# Patient Record
Sex: FEMALE | Race: WHITE | URBAN - METROPOLITAN AREA
[De-identification: names, ages, dates, MRNs, and addresses within clinical notes are randomized per-mention and may not be internally consistent; named-entity substitution may affect disease eponyms.]

---

## 2021-10-29 LAB
HEPATITIS B SURFACE ANTIGEN (EXTERNAL): NEGATIVE
RUBELLA ANTIBODY IGG (EXTERNAL): NORMAL
TREPONEMA PALLIDUM ANTIBODY (EXTERNAL): NONREACTIVE

## 2021-11-01 LAB — PAP SMEAR - HIM PATIENT REPORTED: NEGATIVE

## 2021-12-08 ENCOUNTER — HOSPITAL ENCOUNTER (OUTPATIENT)
Facility: CLINIC | Age: 36
End: 2021-12-08
Admitting: OBSTETRICS & GYNECOLOGY
Payer: COMMERCIAL

## 2021-12-24 LAB
ABO (EXTERNAL): NORMAL
RH (EXTERNAL): NEGATIVE

## 2021-12-30 ENCOUNTER — PRE VISIT (OUTPATIENT)
Dept: MATERNAL FETAL MEDICINE | Facility: CLINIC | Age: 36
End: 2021-12-30
Payer: COMMERCIAL

## 2022-01-06 ENCOUNTER — OFFICE VISIT (OUTPATIENT)
Dept: MATERNAL FETAL MEDICINE | Facility: CLINIC | Age: 37
End: 2022-01-06
Attending: NURSE PRACTITIONER
Payer: COMMERCIAL

## 2022-01-06 ENCOUNTER — HOSPITAL ENCOUNTER (OUTPATIENT)
Dept: ULTRASOUND IMAGING | Facility: CLINIC | Age: 37
Discharge: HOME | End: 2022-01-06
Attending: NURSE PRACTITIONER | Admitting: NURSE PRACTITIONER
Payer: COMMERCIAL

## 2022-01-06 DIAGNOSIS — O26.90 PREGNANCY RELATED CONDITION, ANTEPARTUM: ICD-10-CM

## 2022-01-06 DIAGNOSIS — O09.512 AMA (ADVANCED MATERNAL AGE) PRIMIGRAVIDA 35+, SECOND TRIMESTER: Primary | ICD-10-CM

## 2022-01-06 PROCEDURE — 76811 OB US DETAILED SNGL FETUS: CPT | Mod: 26 | Performed by: OBSTETRICS & GYNECOLOGY

## 2022-01-06 PROCEDURE — 76811 OB US DETAILED SNGL FETUS: CPT

## 2022-01-06 NOTE — PROGRESS NOTES
Please refer to ultrasound report under 'Imaging' Studies of 'Chart Review' tabs.    Sunny Rendon M.D.

## 2022-02-06 ENCOUNTER — HEALTH MAINTENANCE LETTER (OUTPATIENT)
Age: 37
End: 2022-02-06

## 2022-05-12 ENCOUNTER — TRANSFERRED RECORDS (OUTPATIENT)
Dept: HEALTH INFORMATION MANAGEMENT | Facility: CLINIC | Age: 37
End: 2022-05-12
Payer: COMMERCIAL

## 2022-05-12 LAB — GROUP B STREPTOCOCCUS (EXTERNAL): NEGATIVE

## 2022-06-06 ENCOUNTER — HOSPITAL ENCOUNTER (INPATIENT)
Facility: CLINIC | Age: 37
LOS: 3 days | Discharge: HOME | End: 2022-06-09
Attending: OBSTETRICS & GYNECOLOGY | Admitting: OBSTETRICS & GYNECOLOGY
Payer: COMMERCIAL

## 2022-06-06 LAB
ABO/RH(D): ABNORMAL
ANTIBODY SCREEN: POSITIVE
SPECIMEN EXPIRATION DATE: ABNORMAL

## 2022-06-06 PROCEDURE — 722N000001 HC LABOR CARE VAGINAL DELIVERY SINGLE

## 2022-06-06 PROCEDURE — G0463 HOSPITAL OUTPT CLINIC VISIT: HCPCS | Mod: 25

## 2022-06-06 PROCEDURE — 59025 FETAL NON-STRESS TEST: CPT

## 2022-06-06 PROCEDURE — 120N000001 HC R&B MED SURG/OB

## 2022-06-06 RX ORDER — NALOXONE HYDROCHLORIDE 0.4 MG/ML
0.4 INJECTION, SOLUTION INTRAMUSCULAR; INTRAVENOUS; SUBCUTANEOUS
Status: DISCONTINUED | OUTPATIENT
Start: 2022-06-06 | End: 2022-06-07 | Stop reason: HOSPADM

## 2022-06-06 RX ORDER — CARBOPROST TROMETHAMINE 250 UG/ML
250 INJECTION, SOLUTION INTRAMUSCULAR
Status: DISCONTINUED | OUTPATIENT
Start: 2022-06-06 | End: 2022-06-07 | Stop reason: HOSPADM

## 2022-06-06 RX ORDER — PROCHLORPERAZINE 25 MG
25 SUPPOSITORY, RECTAL RECTAL EVERY 12 HOURS PRN
Status: DISCONTINUED | OUTPATIENT
Start: 2022-06-06 | End: 2022-06-07 | Stop reason: HOSPADM

## 2022-06-06 RX ORDER — OXYTOCIN 10 [USP'U]/ML
10 INJECTION, SOLUTION INTRAMUSCULAR; INTRAVENOUS
Status: DISCONTINUED | OUTPATIENT
Start: 2022-06-06 | End: 2022-06-08

## 2022-06-06 RX ORDER — ONDANSETRON 4 MG/1
4 TABLET, ORALLY DISINTEGRATING ORAL EVERY 6 HOURS PRN
Status: DISCONTINUED | OUTPATIENT
Start: 2022-06-06 | End: 2022-06-07 | Stop reason: HOSPADM

## 2022-06-06 RX ORDER — PROCHLORPERAZINE MALEATE 5 MG
10 TABLET ORAL EVERY 6 HOURS PRN
Status: DISCONTINUED | OUTPATIENT
Start: 2022-06-06 | End: 2022-06-07 | Stop reason: HOSPADM

## 2022-06-06 RX ORDER — SODIUM CHLORIDE, SODIUM LACTATE, POTASSIUM CHLORIDE, CALCIUM CHLORIDE 600; 310; 30; 20 MG/100ML; MG/100ML; MG/100ML; MG/100ML
INJECTION, SOLUTION INTRAVENOUS CONTINUOUS
Status: DISCONTINUED | OUTPATIENT
Start: 2022-06-07 | End: 2022-06-07 | Stop reason: HOSPADM

## 2022-06-06 RX ORDER — MULTIVITAMIN WITH IRON
1 TABLET ORAL DAILY
COMMUNITY

## 2022-06-06 RX ORDER — ONDANSETRON 4 MG/1
4 TABLET, ORALLY DISINTEGRATING ORAL EVERY 6 HOURS PRN
Status: DISCONTINUED | OUTPATIENT
Start: 2022-06-06 | End: 2022-06-06 | Stop reason: HOSPADM

## 2022-06-06 RX ORDER — OXYTOCIN/0.9 % SODIUM CHLORIDE 30/500 ML
340 PLASTIC BAG, INJECTION (ML) INTRAVENOUS CONTINUOUS PRN
Status: DISCONTINUED | OUTPATIENT
Start: 2022-06-06 | End: 2022-06-07 | Stop reason: HOSPADM

## 2022-06-06 RX ORDER — OXYTOCIN/0.9 % SODIUM CHLORIDE 30/500 ML
100-340 PLASTIC BAG, INJECTION (ML) INTRAVENOUS CONTINUOUS PRN
Status: DISCONTINUED | OUTPATIENT
Start: 2022-06-06 | End: 2022-06-08

## 2022-06-06 RX ORDER — METHYLERGONOVINE MALEATE 0.2 MG/ML
200 INJECTION INTRAVENOUS
Status: DISCONTINUED | OUTPATIENT
Start: 2022-06-06 | End: 2022-06-07 | Stop reason: HOSPADM

## 2022-06-06 RX ORDER — METOCLOPRAMIDE 10 MG/1
10 TABLET ORAL EVERY 6 HOURS PRN
Status: DISCONTINUED | OUTPATIENT
Start: 2022-06-06 | End: 2022-06-06 | Stop reason: HOSPADM

## 2022-06-06 RX ORDER — ONDANSETRON 2 MG/ML
4 INJECTION INTRAMUSCULAR; INTRAVENOUS EVERY 6 HOURS PRN
Status: DISCONTINUED | OUTPATIENT
Start: 2022-06-06 | End: 2022-06-06 | Stop reason: HOSPADM

## 2022-06-06 RX ORDER — ACETAMINOPHEN 325 MG/1
650 TABLET ORAL EVERY 4 HOURS PRN
Status: DISCONTINUED | OUTPATIENT
Start: 2022-06-06 | End: 2022-06-07 | Stop reason: HOSPADM

## 2022-06-06 RX ORDER — PRENATAL VIT/IRON FUM/FOLIC AC 27MG-0.8MG
1 TABLET ORAL DAILY
COMMUNITY

## 2022-06-06 RX ORDER — KETOROLAC TROMETHAMINE 30 MG/ML
30 INJECTION, SOLUTION INTRAMUSCULAR; INTRAVENOUS
Status: DISCONTINUED | OUTPATIENT
Start: 2022-06-06 | End: 2022-06-08

## 2022-06-06 RX ORDER — METOCLOPRAMIDE HYDROCHLORIDE 5 MG/ML
10 INJECTION INTRAMUSCULAR; INTRAVENOUS EVERY 6 HOURS PRN
Status: DISCONTINUED | OUTPATIENT
Start: 2022-06-06 | End: 2022-06-07 | Stop reason: HOSPADM

## 2022-06-06 RX ORDER — MISOPROSTOL 200 UG/1
400 TABLET ORAL
Status: DISCONTINUED | OUTPATIENT
Start: 2022-06-06 | End: 2022-06-07 | Stop reason: HOSPADM

## 2022-06-06 RX ORDER — PROCHLORPERAZINE 25 MG
25 SUPPOSITORY, RECTAL RECTAL EVERY 12 HOURS PRN
Status: DISCONTINUED | OUTPATIENT
Start: 2022-06-06 | End: 2022-06-06 | Stop reason: HOSPADM

## 2022-06-06 RX ORDER — MISOPROSTOL 200 UG/1
800 TABLET ORAL
Status: DISCONTINUED | OUTPATIENT
Start: 2022-06-06 | End: 2022-06-07 | Stop reason: HOSPADM

## 2022-06-06 RX ORDER — FENTANYL CITRATE 50 UG/ML
100 INJECTION, SOLUTION INTRAMUSCULAR; INTRAVENOUS
Status: DISCONTINUED | OUTPATIENT
Start: 2022-06-06 | End: 2022-06-07 | Stop reason: HOSPADM

## 2022-06-06 RX ORDER — NALOXONE HYDROCHLORIDE 0.4 MG/ML
0.2 INJECTION, SOLUTION INTRAMUSCULAR; INTRAVENOUS; SUBCUTANEOUS
Status: DISCONTINUED | OUTPATIENT
Start: 2022-06-06 | End: 2022-06-07 | Stop reason: HOSPADM

## 2022-06-06 RX ORDER — METOCLOPRAMIDE 10 MG/1
10 TABLET ORAL EVERY 6 HOURS PRN
Status: DISCONTINUED | OUTPATIENT
Start: 2022-06-06 | End: 2022-06-07 | Stop reason: HOSPADM

## 2022-06-06 RX ORDER — ONDANSETRON 2 MG/ML
4 INJECTION INTRAMUSCULAR; INTRAVENOUS EVERY 6 HOURS PRN
Status: DISCONTINUED | OUTPATIENT
Start: 2022-06-06 | End: 2022-06-07 | Stop reason: HOSPADM

## 2022-06-06 RX ORDER — IBUPROFEN 400 MG/1
800 TABLET, FILM COATED ORAL
Status: DISCONTINUED | OUTPATIENT
Start: 2022-06-06 | End: 2022-06-08

## 2022-06-06 RX ORDER — PROCHLORPERAZINE MALEATE 5 MG
10 TABLET ORAL EVERY 6 HOURS PRN
Status: DISCONTINUED | OUTPATIENT
Start: 2022-06-06 | End: 2022-06-06 | Stop reason: HOSPADM

## 2022-06-06 RX ORDER — CITRIC ACID/SODIUM CITRATE 334-500MG
30 SOLUTION, ORAL ORAL
Status: DISCONTINUED | OUTPATIENT
Start: 2022-06-06 | End: 2022-06-07 | Stop reason: HOSPADM

## 2022-06-06 RX ORDER — OXYTOCIN 10 [USP'U]/ML
10 INJECTION, SOLUTION INTRAMUSCULAR; INTRAVENOUS
Status: DISCONTINUED | OUTPATIENT
Start: 2022-06-06 | End: 2022-06-07 | Stop reason: HOSPADM

## 2022-06-06 RX ORDER — TRANEXAMIC ACID 10 MG/ML
1 INJECTION, SOLUTION INTRAVENOUS EVERY 30 MIN PRN
Status: DISCONTINUED | OUTPATIENT
Start: 2022-06-06 | End: 2022-06-07 | Stop reason: HOSPADM

## 2022-06-06 RX ORDER — METOCLOPRAMIDE HYDROCHLORIDE 5 MG/ML
10 INJECTION INTRAMUSCULAR; INTRAVENOUS EVERY 6 HOURS PRN
Status: DISCONTINUED | OUTPATIENT
Start: 2022-06-06 | End: 2022-06-06 | Stop reason: HOSPADM

## 2022-06-06 ASSESSMENT — ACTIVITIES OF DAILY LIVING (ADL)
FALL_HISTORY_WITHIN_LAST_SIX_MONTHS: NO
WEAR_GLASSES_OR_BLIND: NO
CHANGE_IN_FUNCTIONAL_STATUS_SINCE_ONSET_OF_CURRENT_ILLNESS/INJURY: NO
WALKING_OR_CLIMBING_STAIRS_DIFFICULTY: NO
DRESSING/BATHING_DIFFICULTY: NO
DOING_ERRANDS_INDEPENDENTLY_DIFFICULTY: NO
TOILETING_ISSUES: NO
CONCENTRATING,_REMEMBERING_OR_MAKING_DECISIONS_DIFFICULTY: NO
DIFFICULTY_EATING/SWALLOWING: NO

## 2022-06-07 LAB
ANTIBODY ID: NORMAL
ERYTHROCYTE [DISTWIDTH] IN BLOOD BY AUTOMATED COUNT: 12.8 % (ref 10–15)
HCT VFR BLD AUTO: 43.8 % (ref 35–47)
HGB BLD-MCNC: 14.8 G/DL (ref 11.7–15.7)
MCH RBC QN AUTO: 30.5 PG (ref 26.5–33)
MCHC RBC AUTO-ENTMCNC: 33.8 G/DL (ref 31.5–36.5)
MCV RBC AUTO: 90 FL (ref 78–100)
PLATELET # BLD AUTO: 187 10E3/UL (ref 150–450)
RBC # BLD AUTO: 4.85 10E6/UL (ref 3.8–5.2)
SARS-COV-2 RNA RESP QL NAA+PROBE: NEGATIVE
SPECIMEN EXPIRATION DATE: NORMAL
T PALLIDUM AB SER QL: NONREACTIVE
WBC # BLD AUTO: 13.5 10E3/UL (ref 4–11)

## 2022-06-07 PROCEDURE — 86870 RBC ANTIBODY IDENTIFICATION: CPT | Performed by: OBSTETRICS & GYNECOLOGY

## 2022-06-07 PROCEDURE — 86780 TREPONEMA PALLIDUM: CPT | Performed by: OBSTETRICS & GYNECOLOGY

## 2022-06-07 PROCEDURE — 10907ZC DRAINAGE OF AMNIOTIC FLUID, THERAPEUTIC FROM PRODUCTS OF CONCEPTION, VIA NATURAL OR ARTIFICIAL OPENING: ICD-10-PCS | Performed by: OBSTETRICS & GYNECOLOGY

## 2022-06-07 PROCEDURE — G0463 HOSPITAL OUTPT CLINIC VISIT: HCPCS | Mod: 25

## 2022-06-07 PROCEDURE — 86850 RBC ANTIBODY SCREEN: CPT | Performed by: OBSTETRICS & GYNECOLOGY

## 2022-06-07 PROCEDURE — 36415 COLL VENOUS BLD VENIPUNCTURE: CPT | Performed by: OBSTETRICS & GYNECOLOGY

## 2022-06-07 PROCEDURE — 59025 FETAL NON-STRESS TEST: CPT

## 2022-06-07 PROCEDURE — 85027 COMPLETE CBC AUTOMATED: CPT | Performed by: OBSTETRICS & GYNECOLOGY

## 2022-06-07 PROCEDURE — 0HQ9XZZ REPAIR PERINEUM SKIN, EXTERNAL APPROACH: ICD-10-PCS | Performed by: OBSTETRICS & GYNECOLOGY

## 2022-06-07 PROCEDURE — 250N000009 HC RX 250: Performed by: OBSTETRICS & GYNECOLOGY

## 2022-06-07 PROCEDURE — 722N000001 HC LABOR CARE VAGINAL DELIVERY SINGLE

## 2022-06-07 PROCEDURE — 0UQGXZZ REPAIR VAGINA, EXTERNAL APPROACH: ICD-10-PCS | Performed by: OBSTETRICS & GYNECOLOGY

## 2022-06-07 PROCEDURE — U0003 INFECTIOUS AGENT DETECTION BY NUCLEIC ACID (DNA OR RNA); SEVERE ACUTE RESPIRATORY SYNDROME CORONAVIRUS 2 (SARS-COV-2) (CORONAVIRUS DISEASE [COVID-19]), AMPLIFIED PROBE TECHNIQUE, MAKING USE OF HIGH THROUGHPUT TECHNOLOGIES AS DESCRIBED BY CMS-2020-01-R: HCPCS | Performed by: OBSTETRICS & GYNECOLOGY

## 2022-06-07 PROCEDURE — 120N000012 HC R&B POSTPARTUM

## 2022-06-07 RX ORDER — BISACODYL 10 MG
10 SUPPOSITORY, RECTAL RECTAL DAILY PRN
Status: DISCONTINUED | OUTPATIENT
Start: 2022-06-07 | End: 2022-06-09 | Stop reason: HOSPADM

## 2022-06-07 RX ORDER — IBUPROFEN 400 MG/1
800 TABLET, FILM COATED ORAL EVERY 6 HOURS PRN
Status: DISCONTINUED | OUTPATIENT
Start: 2022-06-07 | End: 2022-06-09 | Stop reason: HOSPADM

## 2022-06-07 RX ORDER — MODIFIED LANOLIN
OINTMENT (GRAM) TOPICAL
Status: DISCONTINUED | OUTPATIENT
Start: 2022-06-07 | End: 2022-06-09 | Stop reason: HOSPADM

## 2022-06-07 RX ORDER — MISOPROSTOL 200 UG/1
400 TABLET ORAL
Status: DISCONTINUED | OUTPATIENT
Start: 2022-06-07 | End: 2022-06-09 | Stop reason: HOSPADM

## 2022-06-07 RX ORDER — ACETAMINOPHEN 325 MG/1
650 TABLET ORAL EVERY 4 HOURS PRN
Status: DISCONTINUED | OUTPATIENT
Start: 2022-06-07 | End: 2022-06-09 | Stop reason: HOSPADM

## 2022-06-07 RX ORDER — HYDROCORTISONE 25 MG/G
CREAM TOPICAL 3 TIMES DAILY PRN
Status: DISCONTINUED | OUTPATIENT
Start: 2022-06-07 | End: 2022-06-09 | Stop reason: HOSPADM

## 2022-06-07 RX ORDER — CARBOPROST TROMETHAMINE 250 UG/ML
250 INJECTION, SOLUTION INTRAMUSCULAR
Status: DISCONTINUED | OUTPATIENT
Start: 2022-06-07 | End: 2022-06-09 | Stop reason: HOSPADM

## 2022-06-07 RX ORDER — MISOPROSTOL 200 UG/1
800 TABLET ORAL
Status: DISCONTINUED | OUTPATIENT
Start: 2022-06-07 | End: 2022-06-09 | Stop reason: HOSPADM

## 2022-06-07 RX ORDER — DOCUSATE SODIUM 100 MG/1
100 CAPSULE, LIQUID FILLED ORAL DAILY
Status: DISCONTINUED | OUTPATIENT
Start: 2022-06-07 | End: 2022-06-09 | Stop reason: HOSPADM

## 2022-06-07 RX ORDER — OXYTOCIN/0.9 % SODIUM CHLORIDE 30/500 ML
340 PLASTIC BAG, INJECTION (ML) INTRAVENOUS CONTINUOUS PRN
Status: DISCONTINUED | OUTPATIENT
Start: 2022-06-07 | End: 2022-06-09 | Stop reason: HOSPADM

## 2022-06-07 RX ORDER — OXYTOCIN 10 [USP'U]/ML
10 INJECTION, SOLUTION INTRAMUSCULAR; INTRAVENOUS
Status: DISCONTINUED | OUTPATIENT
Start: 2022-06-07 | End: 2022-06-09 | Stop reason: HOSPADM

## 2022-06-07 RX ORDER — METHYLERGONOVINE MALEATE 0.2 MG/ML
200 INJECTION INTRAVENOUS
Status: DISCONTINUED | OUTPATIENT
Start: 2022-06-07 | End: 2022-06-09 | Stop reason: HOSPADM

## 2022-06-07 RX ORDER — TRANEXAMIC ACID 10 MG/ML
1 INJECTION, SOLUTION INTRAVENOUS EVERY 30 MIN PRN
Status: DISCONTINUED | OUTPATIENT
Start: 2022-06-07 | End: 2022-06-09 | Stop reason: HOSPADM

## 2022-06-07 RX ADMIN — Medication 340 ML/HR: at 03:54

## 2022-06-07 RX ADMIN — LIDOCAINE HYDROCHLORIDE 20 ML: 10 INJECTION, SOLUTION EPIDURAL; INFILTRATION; INTRACAUDAL; PERINEURAL at 03:42

## 2022-06-07 ASSESSMENT — ACTIVITIES OF DAILY LIVING (ADL)
ADLS_ACUITY_SCORE: 22
ADLS_ACUITY_SCORE: 22
ADLS_ACUITY_SCORE: 18
ADLS_ACUITY_SCORE: 22
ADLS_ACUITY_SCORE: 21
ADLS_ACUITY_SCORE: 22
ADLS_ACUITY_SCORE: 31
ADLS_ACUITY_SCORE: 18
ADLS_ACUITY_SCORE: 22
ADLS_ACUITY_SCORE: 18

## 2022-06-07 NOTE — PLAN OF CARE
Patient admitted into room 412 per wheelchair accompanied by , , and L&D RN. Oriented to room, call light, safety measures, and postpartum routines. Verified  bands with parents and L&D RN. Plan of care and teaching initiated, answered questions.   Vital signs stable, afebrile, due to void, ice and tucks to perineum prn, FFU/1 scant rubra lochia, able to ambulate without dizziness, able to rest, declines offer of pain meds so far, breast feeding  skin-to-skin on demand.  is here and is supportive.

## 2022-06-07 NOTE — PLAN OF CARE
Pt and SO Serge admitted and transferred to room 231. Report given to Malou Winter RN assuming care.

## 2022-06-07 NOTE — L&D DELIVERY NOTE
DELIVERY SUMMARY:  Date: 2022     HISTORY:    Angela Woo is a 37 year old  at 39w6d admitted for active labor.     Pregnancy c/b:  1. AMA with normal level 2, NIPT negative  2. O negative     Arrived at 5 cm. Has progressed on her own to completely dilated without pain medication.        GBS negative.  She is Rh negative and Rubella immune.      FIRST STAGE:  She presented to labor and delivery with labor.  Amniotic fluid was noted to be clear at the time of amniotomy.  She progressed to complete at 0230.  No analgesia.    SECOND STAGE:   Fetal heart tones were reassuring during the second stage of labor.  Head delivered CURTIS.  No nuchal cord. Shoulders were delivered without difficulty and the remainder of the body followed.  The infant was placed directly on the maternal abdomen.  Cord clamping was delayed 60 seconds after which the cord was clamped and cut.  Cord blood was obtained.  IV Pitocin was given through running IV.  Apgars pending- baby crying right away at delivery.  Weight pending.    THIRD STAGE:  The placenta delivered spontaneously. A small right vaginal laceration repaired with 3-0 vicryl in running fashion. A left labial tear repaired with interrupted 3-0 vicryl suture in interrupted sutures.    The uterus was noted to be firm.  Sponge and needle counts were correct.   ml.    Mom and baby doing well and stable to transfer to postpartum recovery.    Mariah Ledbetter MD  Obstetrics, Gynecology, and Infertility

## 2022-06-07 NOTE — PROVIDER NOTIFICATION
06/06/22 2330   Provider Notification   Provider Name/Title Dr Ledbetter   Method of Notification Electronic Page;Phone   Request Evaluate - Remote   Notified Dr Ledbetter that pt arrived. Discussed with MD Stubbs, FHR, SVE. Per Dr Ledbetter, admit to inpatient and add intrapartum orders.

## 2022-06-07 NOTE — PLAN OF CARE
Pt. declines need for pain relief. Voiding without difficulty. Up and about in room. Will continue to monitor.

## 2022-06-07 NOTE — PLAN OF CARE
Pt arrived with SO for labor assessment. Pt agrees to monitors and assessment. Pt states that she started jacob around 2000 tonight.

## 2022-06-07 NOTE — PLAN OF CARE
Vital signs stable, fundus firm, scant rubra flow. Patient is up ad randi, due to void.  pain well controlled without medications Patient is bonding well with infant. Transferred to  rm 412 in wheelchair, with infant in arms, accompanied by significant other. Report given to Hanna ROSENBAUM RN.

## 2022-06-07 NOTE — H&P
[facility] Labor and Delivery History and Physical    Angela Woo MRN# [id number]   Age: 37 year old YOB: 1985     Date of Admission:  2022    Primary care provider: No primary care provider on file.           HPI:   Angela Woo is a 37 year old  at 39w6d admitted for active labor.    Pregnancy c/b:  1. AMA with normal level 2, NIPT negative  2. O negative    Arrived at 5 cm. Has progressed on her own to completely dilated without pain medication.              Pregnancy history:     OBSTETRIC HISTORY:  OB History    Para Term  AB Living   2 0 0 0 1 0   SAB IAB Ectopic Multiple Live Births   1 0 0 0 0      # Outcome Date GA Lbr Yung/2nd Weight Sex Delivery Anes PTL Lv   2 Current            1 SAB                EDC: Estimated Date of Delivery: 2022    Prenatal Labs:   Lab Results   Component Value Date    HGB 14.8 2022       GBS Status:   Lab Results   Component Value Date    GBS Negative 2022             Maternal Past Medical History:   History reviewed. No pertinent past medical history.  History reviewed. No pertinent surgical history.   Medications Prior to Admission   Medication Sig Dispense Refill Last Dose     Docosahexaenoic Acid (DHA) 200 MG capsule Take 200 mg by mouth daily   2022 at 0800     magnesium 250 MG tablet Take 1 tablet by mouth daily   2022 at 0800     Prenatal Vit-Fe Fumarate-FA (PRENATAL MULTIVITAMIN W/IRON) 27-0.8 MG tablet Take 1 tablet by mouth daily   2022 at 0800     Vitamin D3 (CHOLECALCIFEROL) 125 MCG (5000 UT) tablet Take by mouth daily   2022 at 0800           Family History:   The family history is not on file.          Social History:     Social History     Tobacco Use     Smoking status: Never Smoker     Smokeless tobacco: Never Used   Substance Use Topics     Alcohol use: Not Currently            Review of Systems:   The Review of Systems is negative other than noted in the HPI          Physical Exam:  "    Patient Vitals for the past 8 hrs:   BP Temp Temp src Resp Height Weight   22 2351 -- -- -- 16 -- --   22 2320 -- -- -- -- 1.651 m (5' 5\") 72.6 kg (160 lb)   22 138/86 99.14  F (37.3  C) Temporal 16 -- --   22 -- -- -- 16 -- --     Gen: NAD  CV: Normal  Resp: unlabored  Abd: gravid NT  Ext: No edema, NT    Cervix: c/c/+1, AROM with clear fluid  EFW: 7.5 lbs  Presentation:Cephalic    NST  Fetal Heart Rate Tracing:   Baseline: 120   Variability: moderate  Accelerations: Present  Decelerations: None  Interpretation: reactive    Contractions: Q1-2 minutes        Assessment:   Angela Woo is a 37 year old  at 39w6d admitted for active labor.         Plan:   1. Admit to R  2. Fetal wellbeing: Category 1  3. Continuous EFM and Paia  4. GBS negative  5. Type and Screen and CBC    Mariah Ledbetter MD  2022  3:15 AM    "

## 2022-06-07 NOTE — PROGRESS NOTES
"[facility] Obstetrics Postpartum Progress Note  2022     S: Pt doing well. Pain is well controlled. Bleeding Moderate. Infant is being . Voiding spontaneously. No BM yet.    O:  /73   Pulse 53   Temp 97.9  F (36.6  C) (Oral)   Resp 18   Ht 1.651 m (5' 5\")   Wt 72.6 kg (160 lb)   Breastfeeding Unknown   BMI 26.63 kg/m     Gen: healthy, alert and no distress    Resp: nonlabored breathing  Abd: soft, nondistended, appropriately TTP, FF at u  Ext: non-tender, no edema    Hemoglobin   Date Value Ref Range Status   2022 14.8 11.7 - 15.7 g/dL Final     ABO/Rh: O neg    A: 37 year old  PPD#0 s/p    P:   Routine postpartum cares.    Ambulation encouraged  Breast feeding strategies discussed  Monitor wound for signs of infection  Pain control measures as needed  Reportable signs and symptoms dicussed with the patient  RhoGam to be given if indicated  Anticipate discharge tomorrow and orders done      Mai Mason, DO   Obstetrics, Gynecology, and Infertility        "

## 2022-06-08 LAB — HGB BLD-MCNC: 12.1 G/DL (ref 11.7–15.7)

## 2022-06-08 PROCEDURE — 85018 HEMOGLOBIN: CPT | Performed by: OBSTETRICS & GYNECOLOGY

## 2022-06-08 PROCEDURE — 120N000012 HC R&B POSTPARTUM

## 2022-06-08 PROCEDURE — 36415 COLL VENOUS BLD VENIPUNCTURE: CPT | Performed by: OBSTETRICS & GYNECOLOGY

## 2022-06-08 ASSESSMENT — ACTIVITIES OF DAILY LIVING (ADL)
ADLS_ACUITY_SCORE: 18

## 2022-06-08 NOTE — PLAN OF CARE
"Initial and discharge visit with Mother and Father and baby boy.  Mother states breast feeding is going well but her nipples are sore.  She is also using a nipple shield for smoother nipples.      Mother and Father educated on normal  behavior, focusing on normal feeding patterns from birth to day 3 of life.   Reviewed early milk volumes and how to know infant is getting enough by recording feedings and wet/dirty diapers. Reassured parents that we will monitor infant's weight every night.    Discussed  breastfeeding basics: 1) watch for early feeding cues (licking lips, stirring or rooting, sucking movement with mouth, hands to mouth), 2) feed infant on demand, a minimum of 8 times in 24 hours, and 3) techniques to waking a sleepy baby to nurse (undress infant, change diaper if necessary, gently stroking bottom of feet and back, snuggling infant skin to skin, expressing colostrum).   Breastfeeding general information reviewed. Reviewed with Mother and Father the breastfeeding section in admission booklet \"Guide to Postpartum and  Care\"  and encouraged to record infant feedings, voids, and stools in the feeding log.    Advised to breastfeed exclusively, on demand, avoid pacifiers, bottles and formula unless medically indicated.  Encouraged rooming in, skin to skin, feeding on demand 8-12x/day or sooner if baby cues.      Infant due for a feeding at time of visit.  LC reviewed with Mother proper positioning of infant, maternal hand placement, using breast feeding support pillows, and how to help infant achieve a deep latch with feedings.  Reviewed importance of getting a deep latch with feedings versus a shallow latch.  LC reviewed use of nipple shield.  LC assisted mother to get infant latched onto right breast in the cross cradle position.  Good latch noted with strong, continuous suckle pattern.   Infant tolerates feeding well.  Mother states that the latch feels way better and does not hurt! " Mother is relieved by this.    Encouraged Mother to call for assistance with latch or positioning if needed.  Appreciative of visit.  Asking appropriate questions.  No further questions at this time. Will follow as needed.     Merari Bruno RN, IBCLC

## 2022-06-08 NOTE — PLAN OF CARE
Date/Time: June 8, 2022    Reason for Admission:vaginal delivery    End of shift summary: VSS. Fundus F/-1, lochia scant. Second degree laceration.  Neuros intact. Denies N/V. Voiding spontaneously. breastfeeding adequately with shield; infant sleepy at times. Pain managed without pain medication - pt aware medication is available. Appears to be bonding well with infant. Support person, Serge, at bedside helping with cares to both mom and infant. Education completed and questions answered.

## 2022-06-08 NOTE — PLAN OF CARE
AOx4. VSS, RA. Independent with cares. Voiding. FF/U1, scant flow. No reports of pain - refused PRNs. Significant other supportive. Discharge 6/9.

## 2022-06-08 NOTE — PROGRESS NOTES
"[facility] Obstetrics Postpartum Progress Note  2022   PPD #1  S: Pt doing well. Pain is well controlled. Is having some hemorrhoid pain.  Bleeding Light. Infant is being . Voiding spontaneously. Has not had a BM.   Baby with borderline bili and needs recheck, not really feeling comfortable with breast feeding yet.     O:  /79 (BP Location: Right arm)   Pulse 56   Temp 97.6  F (36.4  C) (Oral)   Resp 16   Ht 1.651 m (5' 5\")   Wt 72.6 kg (160 lb)   Breastfeeding Unknown   BMI 26.63 kg/m  Patient Vitals for the past 24 hrs:   BP Temp Temp src Pulse Resp   22 2326 119/79 97.6  F (36.4  C) Oral 56 16   22 120/75 -- -- 57 16   22 1559 115/77 98  F (36.7  C) Oral 56 16   22 1145 118/75 97.8  F (36.6  C) Oral 57 16      Gen: healthy, alert and no distress    Resp: nonlabored breathing  Abd: soft, nondistended, appropriately TTP, FF at umb  Ext: non-tender, no edema    Hemoglobin   Date Value Ref Range Status   2022 14.8 11.7 - 15.7 g/dL Final     Lab Results   Component Value Date    AS Positive (A) 2022      Latest Reference Range & Units 22 00:23   ABO/Rh(D)  O NEG   Antibody Screen Negative  Positive !   !: Data is abnormal   Latest Reference Range & Units 22 00:23   Treponema Antibodies Nonreactive  Nonreactive      Latest Reference Range & Units 10/29/21 00:00   Rubella Antibody IgG (External) Nonreactive  Immune (E)   (E): External lab result  A: 37 year old  PPD#1  s/p  after active labor  Doing well  Overall pain controlled.   Still having some BF issues and baby with borderline bili  Rh negative, baby also Rh Negative  P:   Routine postpartum cares.    Ambulation encouraged  Lactation consultation  Pain control measures as needed  Reportable signs and symptoms dicussed with the patient  No rhogam indicated  Anticipate discharge tomorrow      Nely Peter MD   Obstetrics, Gynecology, and Infertility        "

## 2022-06-09 VITALS
BODY MASS INDEX: 26.66 KG/M2 | HEIGHT: 65 IN | DIASTOLIC BLOOD PRESSURE: 74 MMHG | RESPIRATION RATE: 18 BRPM | WEIGHT: 160 LBS | OXYGEN SATURATION: 96 % | HEART RATE: 62 BPM | SYSTOLIC BLOOD PRESSURE: 111 MMHG | TEMPERATURE: 97.7 F

## 2022-06-09 ASSESSMENT — ACTIVITIES OF DAILY LIVING (ADL)
ADLS_ACUITY_SCORE: 18

## 2022-06-09 NOTE — PLAN OF CARE
Date/Time: June 9, 2022    Reason for Admission:vaginal delivery    End of shift summary: VSS. Fundus F/-2, lochia scant. Second degree laceration.  Neuros intact. Denies N/V. Voiding spontaneously. breast feeding adequately with shield, encouraging mom to call for feedings to help latch. Pain managed without medication, declines tylenol and ibuprofen. Appears to be bonding well with infant. Support person at bedside helping with cares to both mom and infant. Education completed and questions answered.

## 2022-06-09 NOTE — PROGRESS NOTES
"[facility] Obstetrics Postpartum Progress Note  2022   PPD #2  S: Pt doing well. Pain is well controlled. Bleeding Light. Infant is being . Voiding spontaneously.     O:  /74   Pulse 62   Temp 97.7  F (36.5  C) (Oral)   Resp 18   Ht 1.651 m (5' 5\")   Wt 72.6 kg (160 lb)   SpO2 96%   Breastfeeding Unknown   BMI 26.63 kg/m    Patient Vitals for the past 24 hrs:   BP Temp Temp src Pulse Resp SpO2   22 0815 -- -- -- -- -- 96 %   22 0752 111/74 97.7  F (36.5  C) Oral 62 18 --   22 0100 120/80 -- -- 50 16 --   22 2000 118/75 -- -- 62 16 --      Gen: healthy, alert and no distress    Resp: nonlabored breathing  Abd: soft, nondistended, appropriately TTP, FF at umb  Ext: non-tender, no edema    Hemoglobin   Date Value Ref Range Status   2022 12.1 11.7 - 15.7 g/dL Final   2022 14.8 11.7 - 15.7 g/dL Final     Lab Results   Component Value Date    AS Positive (A) 2022      Latest Reference Range & Units 22 00:23   ABO/Rh(D)  O NEG   Antibody Screen Negative  Positive !   !: Data is abnormal   Latest Reference Range & Units 22 00:23   Treponema Antibodies Nonreactive  Nonreactive      Latest Reference Range & Units 10/29/21 00:00   Rubella Antibody IgG (External) Nonreactive  Immune (E)   (E): External lab result  A: 37 year old  PPD#2  s/p  after active labor  Doing well  Overall pain controlled.   Rh negative, baby also Rh Negative    P:   Routine postpartum cares.    Ambulation encouraged  Lactation consultation  Pain control measures as needed  Reportable signs and symptoms dicussed with the patient  No rhogam indicated  Discharge today       Aruna Holcomb MD    Obstetrics, Gynecology, and Infertility        "

## 2022-06-09 NOTE — LACTATION NOTE
"Routine Lactation visit with Angela, significant other Serge & baby boy Tien. Getting ready for discharge. Angela reports feeding is going ok, but does share her nipples have been tender and feels like feeding has been painful throughout feeding. She's using a nipple shield. Checked baby's suck with gloved finger, noted more \"tight\" suck, although able to extend tongue to lower gumline. LC assisted to move baby Tien to cross cradle hold, demonstrated good positioning at the breast, and assisted to get him latched more deeply, rolling lower lip down and out. Angela able to return demonstration and appreciative of reminder of better positioning. After adjustment, Angela stated latch wasn't painful and she's relieved.  Discussed cluster feeding, what it is and when to expect it, The Second Night, satiety cues, feeding cues, and reviewed Feeding Log for home use. Encouraged to review Breastfeeding section in Your Guide to Postpartum &  Care.    Discussed listening for audible swallowing as milk volume increases and looking for milk inside of the shield after feedings to determine if baby is transferring milk well when using nipple shield. Discussed strategies for eventual weaning from shield use and recommended outpatient Lactation follow up to assist with weaning from shield.    Reviewed milk supply and engorgement. Reviewed typical timeline of milk supply initiation and progression over first 3-5 days postpartum. Discussed comfort measures for engorgement, plugged duct treatment, and warning signs of breast infection. General questions answered regarding pumping, when it's helpful and necessary.     Feeding plan: Recommend unlimited, frequent breast feedings: At least 8 - 12 times every 24 hours. Avoid pacifiers and supplementation with formula unless medically indicated. Encouraged use of feeding log and to record feedings, and void/stool patterns. Angela has a breast pump for home use. Follow up with Daniel lopez, " encouraged Lactation visit within the week due to nipple shield use at discharge. Reviewed outpatient lactation resources. Angela & Serge appreciative of visit.    Kaye Lynn RN-C, IBCLC, MNN, PHN, BSN

## 2022-08-14 ENCOUNTER — HEALTH MAINTENANCE LETTER (OUTPATIENT)
Age: 37
End: 2022-08-14

## 2022-11-19 ENCOUNTER — HEALTH MAINTENANCE LETTER (OUTPATIENT)
Age: 37
End: 2022-11-19

## 2023-11-19 ENCOUNTER — HEALTH MAINTENANCE LETTER (OUTPATIENT)
Age: 38
End: 2023-11-19

## 2024-04-07 ENCOUNTER — HEALTH MAINTENANCE LETTER (OUTPATIENT)
Age: 39
End: 2024-04-07

## 2024-12-29 ENCOUNTER — HEALTH MAINTENANCE LETTER (OUTPATIENT)
Age: 39
End: 2024-12-29